# Patient Record
Sex: FEMALE | Race: WHITE | NOT HISPANIC OR LATINO | RURAL
[De-identification: names, ages, dates, MRNs, and addresses within clinical notes are randomized per-mention and may not be internally consistent; named-entity substitution may affect disease eponyms.]

---

## 2024-02-15 ENCOUNTER — INITIAL CONSULT (OUTPATIENT)
Dept: VASCULAR SURGERY | Facility: CLINIC | Age: 74
End: 2024-02-15
Payer: MEDICARE

## 2024-02-15 VITALS — BODY MASS INDEX: 22.5 KG/M2 | WEIGHT: 140 LBS | HEIGHT: 66 IN

## 2024-02-15 DIAGNOSIS — I65.23 BILATERAL CAROTID ARTERY STENOSIS: Primary | ICD-10-CM

## 2024-02-15 PROCEDURE — 99203 OFFICE O/P NEW LOW 30 MIN: CPT | Mod: PBBFAC | Performed by: SURGERY

## 2024-02-15 PROCEDURE — 99202 OFFICE O/P NEW SF 15 MIN: CPT | Mod: S$PBB,,, | Performed by: SURGERY

## 2024-02-15 RX ORDER — EZETIMIBE 10 MG/1
10 TABLET ORAL
COMMUNITY
Start: 2024-02-07

## 2024-02-15 RX ORDER — NAPROXEN SODIUM 220 MG/1
81 TABLET, FILM COATED ORAL DAILY
COMMUNITY

## 2024-02-15 RX ORDER — METOPROLOL TARTRATE 50 MG/1
50 TABLET ORAL 2 TIMES DAILY
COMMUNITY
Start: 2024-01-12

## 2024-02-15 RX ORDER — TAFLUPROST OPTHALMIC 0 MG/.3ML
1 SOLUTION/ DROPS OPHTHALMIC DAILY
COMMUNITY

## 2024-02-15 RX ORDER — PRAVASTATIN SODIUM 20 MG/1
20 TABLET ORAL
COMMUNITY
Start: 2024-01-12

## 2024-02-15 RX ORDER — LOSARTAN POTASSIUM 25 MG/1
25 TABLET ORAL
COMMUNITY
Start: 2024-01-12

## 2024-02-15 NOTE — PROGRESS NOTES
"Subjective:       Patient ID: Sakina Peña is a 73 y.o. female.    Chief Complaint: Carotid Artery Disease  Asymptomatic cerebrovascular disease.  Denies strokes mini strokes TIAs facial droop blurred vision motor sensory issues her extremities.  She has an aortic valve replacement.  She has a nonsmoker.  She walks 2 miles a day.  Her  is with her today    CT angio of the neck due to abnormal carotid duplex studies right ICA 50-65% focal stenosis.  Left ICA 30% stenosis.    I have personally reviewed her CT angiogram films myself and formulated an impression  family history includes Heart disease in her brother.  Past Medical History:   Diagnosis Date    Carotid stenosis     Dyslipidemia     HTN (hypertension)     LVH (left ventricular hypertrophy)       Past Surgical History:   Procedure Laterality Date    aortic valve replacement      CARDIAC CATHETERIZATION       SECTION         reports that she has never smoked. She has never used smokeless tobacco. She reports that she does not currently use alcohol. She reports that she does not use drugs.   HPI  Review of Systems      Objective:      Ht 5' 6" (1.676 m)   Wt 63.5 kg (140 lb)   BMI 22.60 kg/m²    Physical Exam  Exam conducted with a chaperone present.   HENT:      Head: Normocephalic and atraumatic.   Cardiovascular:      Rate and Rhythm: Normal rate.      Pulses: Normal pulses.   Pulmonary:      Effort: Pulmonary effort is normal.   Musculoskeletal:      Cervical back: Normal range of motion.   Skin:     Capillary Refill: Capillary refill takes less than 2 seconds.   Neurological:      General: No focal deficit present.      Mental Status: She is alert.   Psychiatric:         Mood and Affect: Mood normal.         Behavior: Behavior normal.         Thought Content: Thought content normal.         Judgment: Judgment normal.           Assessment:       1. Bilateral carotid artery stenosis        Plan:       This time we will continue current " medical therapy, give consideration intervention greater than 70%.  Educated her and her  regarding signs and symptoms.  We will get a carotid duplex study in a year

## 2025-02-10 ENCOUNTER — HOSPITAL ENCOUNTER (OUTPATIENT)
Dept: RADIOLOGY | Facility: HOSPITAL | Age: 75
Discharge: HOME | End: 2025-02-10
Attending: SURGERY
Payer: MEDICARE

## 2025-02-10 DIAGNOSIS — I65.23 BILATERAL CAROTID ARTERY STENOSIS: ICD-10-CM

## 2025-02-10 PROCEDURE — 93880 EXTRACRANIAL BILAT STUDY: CPT | Mod: TC

## 2025-02-10 PROCEDURE — 93880 EXTRACRANIAL BILAT STUDY: CPT | Mod: 26,,, | Performed by: SURGERY

## 2025-02-13 DIAGNOSIS — I65.23 BILATERAL CAROTID ARTERY STENOSIS: Primary | ICD-10-CM

## 2025-02-14 ENCOUNTER — TELEPHONE (OUTPATIENT)
Dept: VASCULAR SURGERY | Facility: CLINIC | Age: 75
End: 2025-02-14
Payer: MEDICARE

## 2025-02-14 NOTE — TELEPHONE ENCOUNTER
----- Message from Yanci sent at 2/14/2025  8:29 AM CST -----  Who Called: Sakina ELIZABETH Casey    Patient is returning phone call    Who Left Message for Patient:not sure   Does the patient know what this is regarding?:test results       Preferred Method of Contact: Phone Call  Patient's Preferred Phone Number on File: [phone]  Best Call Back Number, if different:  Additional Information:

## 2025-02-14 NOTE — TELEPHONE ENCOUNTER
Notified patient of Carotid US results per LURDES Lucas, carotid stenosis stable and does not require any surgery at this time. LURDES Lucas recommends 1 year follow up with carotid US. Patient verbalized understanding. No questions or concerns at this time.